# Patient Record
Sex: FEMALE | Race: WHITE | NOT HISPANIC OR LATINO | Employment: FULL TIME | URBAN - NONMETROPOLITAN AREA
[De-identification: names, ages, dates, MRNs, and addresses within clinical notes are randomized per-mention and may not be internally consistent; named-entity substitution may affect disease eponyms.]

---

## 2018-08-17 ENCOUNTER — TELEPHONE (OUTPATIENT)
Dept: SURGERY | Facility: CLINIC | Age: 28
End: 2018-08-17

## 2018-08-17 NOTE — TELEPHONE ENCOUNTER
Called the patient to remind them of an upcoming appointment with general surgery. I was unable to reach to reach the patient.

## 2018-08-20 ENCOUNTER — TELEPHONE (OUTPATIENT)
Dept: SURGERY | Facility: CLINIC | Age: 28
End: 2018-08-20

## 2018-08-20 ENCOUNTER — OFFICE VISIT (OUTPATIENT)
Dept: SURGERY | Facility: CLINIC | Age: 28
End: 2018-08-20

## 2018-08-20 VITALS
WEIGHT: 178 LBS | BODY MASS INDEX: 30.39 KG/M2 | SYSTOLIC BLOOD PRESSURE: 102 MMHG | RESPIRATION RATE: 16 BRPM | TEMPERATURE: 97.8 F | OXYGEN SATURATION: 100 % | HEIGHT: 64 IN | DIASTOLIC BLOOD PRESSURE: 66 MMHG | HEART RATE: 70 BPM

## 2018-08-20 DIAGNOSIS — L72.3 SEBACEOUS CYST: Primary | ICD-10-CM

## 2018-08-20 PROCEDURE — 99202 OFFICE O/P NEW SF 15 MIN: CPT | Performed by: SURGERY

## 2018-08-20 NOTE — PROGRESS NOTES
Patient: Ruth Gary    YOB: 1990    Date: 08/20/2018    Primary Care Provider: Irena Padron APRN    Chief Complaint   Patient presents with   • Skin Lesion     right posterior thigh.       Subjective .     History of present illness:  Pt is here for evaluation of a skin lesion on her posterior right thigh which has been present for many years.  Pt stated that the skin lesion has gotten bigger in size recently.  Pt denies redness and she denies warmth at the site.  No drainage from lesion.  Increased discomfort and pain.  Especially with sitting.    The following portions of the patient's history were reviewed and updated as appropriate: allergies, current medications, past family history, past medical history, past social history, past surgical history and problem list.      Review of Systems   Constitutional: Negative for chills, fever and unexpected weight change.   HENT: Negative for hearing loss, trouble swallowing and voice change.    Eyes: Negative for visual disturbance.   Respiratory: Negative for apnea, cough, chest tightness, shortness of breath and wheezing.    Cardiovascular: Negative for chest pain, palpitations and leg swelling.   Gastrointestinal: Negative for abdominal distention, abdominal pain, anal bleeding, blood in stool, constipation, diarrhea, nausea, rectal pain and vomiting.   Endocrine: Negative for cold intolerance and heat intolerance.   Genitourinary: Negative for difficulty urinating, dysuria and flank pain.   Musculoskeletal: Negative for back pain and gait problem.   Skin: Negative for color change, rash and wound.   Neurological: Negative for dizziness, syncope, speech difficulty, weakness, light-headedness, numbness and headaches.   Hematological: Negative for adenopathy. Does not bruise/bleed easily.   Psychiatric/Behavioral: Negative for confusion. The patient is not nervous/anxious.        Allergies:  No Known Allergies    Medications:  No current  "outpatient prescriptions on file.    History\"  No past medical history on file.    Past Surgical History:   Procedure Laterality Date   •  SECTION     • WISDOM TOOTH EXTRACTION         Family History   Problem Relation Age of Onset   • Hypertension Mother    • Hypertension Father    • Diabetes Father        Social History   Substance Use Topics   • Smoking status: Current Some Day Smoker   • Smokeless tobacco: Never Used   • Alcohol use Yes        Objective     Vital Signs:   Vitals:    18 1407   BP: 102/66   Pulse: 70   Resp: 16   Temp: 97.8 °F (36.6 °C)   TempSrc: Temporal Artery    SpO2: 100%   Weight: 80.7 kg (178 lb)   Height: 162.6 cm (64\")       Physical Exam:   General Appearance:    Alert, cooperative, in no acute distress   Head:    Normocephalic, without obvious abnormality, atraumatic   Eyes:            Lids and lashes normal, conjunctivae and sclerae normal, no   icterus, no pallor, corneas clear, PERRLA   Ears:    Ears appear intact with no abnormalities noted   Throat:   No oral lesions, no thrush, oral mucosa moist   Neck:   No adenopathy, supple, trachea midline, no thyromegaly, no   carotid bruit, no JVD   Lungs:     Clear to auscultation,respirations regular, even and                  unlabored    Heart:    Regular rhythm and normal rate, normal S1 and S2, no            murmur, no gallop, no rub, no click   Chest Wall:    No abnormalities observed   Abdomen:     Normal bowel sounds, no masses, no organomegaly, soft        non-tender, non-distended, no guarding, no rebound                tenderness   Extremities:   Moves all extremities well, no edema, no cyanosis, no             redness   Pulses:   Pulses palpable and equal bilaterally   Skin:   No bleeding, bruising or rash.  2.5-3 cm cyst right medial thigh.  Tender and fluctuant    Lymph nodes:   No palpable adenopathy   Neurologic:   Cranial nerves 2 - 12 grossly intact, sensation intact, DTR       present and equal bilaterally "     Results Review:   I reviewed the patient's new clinical results.    Assessment/Plan     1. Sebaceous cyst        Schedule excision of lesion in the office.  Risk of bleeding, infection and recurrence discussed and patient agreeable    I discussed the patients findings and my recommendations with patient    Review of Systems was reviewed and confirmed as accurate today.    Electronically signed by Rosemary Sam MD  08/20/18      .    Portions of this note have been scribed for Rosemary Sam MD by Alcira Nance. 8/20/2018  2:30 PM

## 2018-08-24 ENCOUNTER — TELEPHONE (OUTPATIENT)
Dept: SURGERY | Facility: CLINIC | Age: 28
End: 2018-08-24

## 2018-08-27 ENCOUNTER — TELEPHONE (OUTPATIENT)
Dept: SURGERY | Facility: CLINIC | Age: 28
End: 2018-08-27

## 2018-08-27 ENCOUNTER — PROCEDURE VISIT (OUTPATIENT)
Dept: SURGERY | Facility: CLINIC | Age: 28
End: 2018-08-27

## 2018-08-27 VITALS
HEIGHT: 64 IN | WEIGHT: 178 LBS | BODY MASS INDEX: 30.39 KG/M2 | SYSTOLIC BLOOD PRESSURE: 110 MMHG | OXYGEN SATURATION: 99 % | HEART RATE: 78 BPM | TEMPERATURE: 98.2 F | DIASTOLIC BLOOD PRESSURE: 60 MMHG

## 2018-08-27 DIAGNOSIS — L72.3 SEBACEOUS CYST: Primary | ICD-10-CM

## 2018-08-27 PROCEDURE — 12032 INTMD RPR S/A/T/EXT 2.6-7.5: CPT | Performed by: SURGERY

## 2018-08-27 PROCEDURE — 11403 EXC TR-EXT B9+MARG 2.1-3CM: CPT | Performed by: SURGERY

## 2018-08-27 NOTE — PROGRESS NOTES
Location:posterior right thigh    Procedure: Excision 2.8 cm cyst right buttock with layered closure      I recommend excision. Procedure and the risks and benefits were explained including bleeding and infection. The patient understands these and wishes to proceed.     The patient was brought to the procedure room. Consent and time out were performed. The area was prepped and draped in the usual fashion. 1% lidocaine with epinephrine was infused locally. An ellyptical incision was made around the lesion. Full thickness excision was performed. The lesion size was 2.8 cm. The wound was closed in layers with interrupted simple vicryl and Nylon for the skin. Wound closure size was 3.5 cm. There were no complications and the patient tolerated the procedure well. Hemostasis was well controlled with pressure and there was minimal blood loss. Wound instructions were given.     Portions of this note have been scribed for Rosemary Sam MD by Lakeisha Lancaster. 8/27/2018  2:58 PM

## 2018-09-04 ENCOUNTER — OFFICE VISIT (OUTPATIENT)
Dept: SURGERY | Facility: CLINIC | Age: 28
End: 2018-09-04

## 2018-09-04 DIAGNOSIS — Z98.890 H/O REMOVAL OF CYST: Primary | ICD-10-CM

## 2018-09-04 NOTE — PROGRESS NOTES
Patient comes in today to have suture removal from a recent excision of a cyst at right buttock. The sutures were removed and there was no redness or drainage from the sight. The patient has no complaints.         Portions of this note have been scribed for NURSE [facility] by Gabi Gray CMA 9/4/2018  11:35 AM

## 2024-02-22 ENCOUNTER — APPOINTMENT (OUTPATIENT)
Dept: GENERAL RADIOLOGY | Facility: HOSPITAL | Age: 34
End: 2024-02-22
Payer: COMMERCIAL

## 2024-02-22 ENCOUNTER — HOSPITAL ENCOUNTER (EMERGENCY)
Facility: HOSPITAL | Age: 34
Discharge: HOME | End: 2024-02-22
Attending: EMERGENCY MEDICINE
Payer: COMMERCIAL

## 2024-02-22 VITALS
RESPIRATION RATE: 16 BRPM | OXYGEN SATURATION: 97 % | BODY MASS INDEX: 30.73 KG/M2 | TEMPERATURE: 98.2 F | DIASTOLIC BLOOD PRESSURE: 77 MMHG | SYSTOLIC BLOOD PRESSURE: 118 MMHG | HEART RATE: 75 BPM | HEIGHT: 64 IN | WEIGHT: 180 LBS

## 2024-02-22 DIAGNOSIS — R07.9 CHEST PAIN, UNSPECIFIED TYPE: Primary | ICD-10-CM

## 2024-02-22 LAB
ALBUMIN SERPL-MCNC: 4.9 G/DL (ref 3.5–5.2)
ALBUMIN/GLOB SERPL: 1.8 G/DL
ALP SERPL-CCNC: 69 U/L (ref 39–117)
ALT SERPL W P-5'-P-CCNC: 13 U/L (ref 1–33)
ANION GAP SERPL CALCULATED.3IONS-SCNC: 15.5 MMOL/L (ref 5–15)
AST SERPL-CCNC: 17 U/L (ref 1–32)
B-HCG UR QL: NEGATIVE
BASOPHILS # BLD AUTO: 0.02 10*3/MM3 (ref 0–0.2)
BASOPHILS NFR BLD AUTO: 0.3 % (ref 0–1.5)
BILIRUB SERPL-MCNC: 0.4 MG/DL (ref 0–1.2)
BILIRUB UR QL STRIP: NEGATIVE
BUN SERPL-MCNC: 12 MG/DL (ref 6–20)
BUN/CREAT SERPL: 14.8 (ref 7–25)
CALCIUM SPEC-SCNC: 9.1 MG/DL (ref 8.6–10.5)
CHLORIDE SERPL-SCNC: 102 MMOL/L (ref 98–107)
CLARITY UR: CLEAR
CO2 SERPL-SCNC: 21.5 MMOL/L (ref 22–29)
COLOR UR: YELLOW
CREAT SERPL-MCNC: 0.81 MG/DL (ref 0.57–1)
DEPRECATED RDW RBC AUTO: 42.3 FL (ref 37–54)
EGFRCR SERPLBLD CKD-EPI 2021: 96.6 ML/MIN/1.73
EOSINOPHIL # BLD AUTO: 0.01 10*3/MM3 (ref 0–0.4)
EOSINOPHIL NFR BLD AUTO: 0.1 % (ref 0.3–6.2)
ERYTHROCYTE [DISTWIDTH] IN BLOOD BY AUTOMATED COUNT: 15.4 % (ref 12.3–15.4)
FLUAV RNA RESP QL NAA+PROBE: NOT DETECTED
FLUBV RNA RESP QL NAA+PROBE: NOT DETECTED
GLOBULIN UR ELPH-MCNC: 2.8 GM/DL
GLUCOSE SERPL-MCNC: 108 MG/DL (ref 65–99)
GLUCOSE UR STRIP-MCNC: NEGATIVE MG/DL
HCT VFR BLD AUTO: 38.9 % (ref 34–46.6)
HGB BLD-MCNC: 12.7 G/DL (ref 12–15.9)
HGB UR QL STRIP.AUTO: NEGATIVE
HOLD SPECIMEN: NORMAL
HOLD SPECIMEN: NORMAL
IMM GRANULOCYTES # BLD AUTO: 0.02 10*3/MM3 (ref 0–0.05)
IMM GRANULOCYTES NFR BLD AUTO: 0.3 % (ref 0–0.5)
KETONES UR QL STRIP: NEGATIVE
LEUKOCYTE ESTERASE UR QL STRIP.AUTO: NEGATIVE
LYMPHOCYTES # BLD AUTO: 1.37 10*3/MM3 (ref 0.7–3.1)
LYMPHOCYTES NFR BLD AUTO: 18.3 % (ref 19.6–45.3)
MCH RBC QN AUTO: 24.7 PG (ref 26.6–33)
MCHC RBC AUTO-ENTMCNC: 32.6 G/DL (ref 31.5–35.7)
MCV RBC AUTO: 75.7 FL (ref 79–97)
MONOCYTES # BLD AUTO: 0.46 10*3/MM3 (ref 0.1–0.9)
MONOCYTES NFR BLD AUTO: 6.1 % (ref 5–12)
NEUTROPHILS NFR BLD AUTO: 5.62 10*3/MM3 (ref 1.7–7)
NEUTROPHILS NFR BLD AUTO: 74.9 % (ref 42.7–76)
NITRITE UR QL STRIP: NEGATIVE
NRBC BLD AUTO-RTO: 0 /100 WBC (ref 0–0.2)
PH UR STRIP.AUTO: 6.5 [PH] (ref 5–8)
PLATELET # BLD AUTO: 273 10*3/MM3 (ref 140–450)
PMV BLD AUTO: 10.6 FL (ref 6–12)
POTASSIUM SERPL-SCNC: 3.9 MMOL/L (ref 3.5–5.2)
PROT SERPL-MCNC: 7.7 G/DL (ref 6–8.5)
PROT UR QL STRIP: NEGATIVE
RBC # BLD AUTO: 5.14 10*6/MM3 (ref 3.77–5.28)
SARS-COV-2 RNA RESP QL NAA+PROBE: NOT DETECTED
SODIUM SERPL-SCNC: 139 MMOL/L (ref 136–145)
SP GR UR STRIP: <=1.005 (ref 1–1.03)
TROPONIN T SERPL HS-MCNC: <6 NG/L
UROBILINOGEN UR QL STRIP: NORMAL
WBC NRBC COR # BLD AUTO: 7.5 10*3/MM3 (ref 3.4–10.8)
WHOLE BLOOD HOLD COAG: NORMAL
WHOLE BLOOD HOLD SPECIMEN: NORMAL

## 2024-02-22 PROCEDURE — 85025 COMPLETE CBC W/AUTO DIFF WBC: CPT

## 2024-02-22 PROCEDURE — 80053 COMPREHEN METABOLIC PANEL: CPT

## 2024-02-22 PROCEDURE — 93005 ELECTROCARDIOGRAM TRACING: CPT

## 2024-02-22 PROCEDURE — 71045 X-RAY EXAM CHEST 1 VIEW: CPT

## 2024-02-22 PROCEDURE — 99284 EMERGENCY DEPT VISIT MOD MDM: CPT

## 2024-02-22 PROCEDURE — 87636 SARSCOV2 & INF A&B AMP PRB: CPT | Performed by: NURSE PRACTITIONER

## 2024-02-22 PROCEDURE — 81003 URINALYSIS AUTO W/O SCOPE: CPT | Performed by: NURSE PRACTITIONER

## 2024-02-22 PROCEDURE — 84484 ASSAY OF TROPONIN QUANT: CPT

## 2024-02-22 PROCEDURE — 81025 URINE PREGNANCY TEST: CPT | Performed by: NURSE PRACTITIONER

## 2024-02-22 RX ORDER — SODIUM CHLORIDE 0.9 % (FLUSH) 0.9 %
10 SYRINGE (ML) INJECTION AS NEEDED
Status: DISCONTINUED | OUTPATIENT
Start: 2024-02-22 | End: 2024-02-22 | Stop reason: HOSPADM

## 2024-02-22 RX ORDER — ASPIRIN 325 MG
325 TABLET ORAL ONCE
Status: DISCONTINUED | OUTPATIENT
Start: 2024-02-22 | End: 2024-02-22

## 2024-02-22 NOTE — DISCHARGE INSTRUCTIONS
Patient requested to follow-up with primary care provider and her cardiologist within the next 7 to 10 days for reevaluation.  Strict return precautions have been given and she verbalized understanding

## 2024-02-22 NOTE — ED PROVIDER NOTES
Pt Name: Ruth Gary  MRN: [id number]  : 1990  Date of Encounter: 2024    PCP: Irena Padron APRN      Subjective    History of Present Illness:    Chief Complaint: Chest pain, tachycardia    History of Present Illness: Ruth Gary is a 34 y.o. female who presents to the ER complaining of  chest pain, tachycardia.  Patient states her chest pain and tachycardia started yesterday has been stable throughout the night has not worsened.  Upon arrival to the emergency room patient's heart rate was 88 chest pain was negative.  Patient denies any shortness of breath cough congestion.  Patient states she has had history of tachycardia and is previously worn a Holter monitor for evaluation.  Patient denies any family history of cardiac disease.        Nurses Notes reviewed and agree, including vitals, allergies, social history and prior medical history.       Allergies:    Patient has no known allergies.    History reviewed. No pertinent past medical history.    Past Surgical History:   Procedure Laterality Date     SECTION      WISDOM TOOTH EXTRACTION         Social History     Socioeconomic History    Marital status: Legally    Tobacco Use    Smoking status: Some Days    Smokeless tobacco: Never   Substance and Sexual Activity    Alcohol use: Yes    Drug use: No    Sexual activity: Defer       Family History   Problem Relation Age of Onset    Hypertension Mother     Hypertension Father     Diabetes Father        REVIEW OF SYSTEMS:     All systems reviewed and not pertinent unless noted.    Review of Systems   Cardiovascular:  Positive for chest pain and palpitations.   All other systems reviewed and are negative.      Objective    Physical Exam  Vitals and nursing note reviewed.   Constitutional:       Appearance: Normal appearance.   HENT:      Head: Normocephalic and atraumatic.   Eyes:      Extraocular Movements: Extraocular movements intact.      Pupils: Pupils are equal,  round, and reactive to light.   Cardiovascular:      Rate and Rhythm: Normal rate and regular rhythm.      Pulses: Normal pulses.           Radial pulses are 1+ on the right side and 1+ on the left side.        Posterior tibial pulses are 1+ on the right side and 1+ on the left side.      Heart sounds: Normal heart sounds.   Pulmonary:      Effort: Pulmonary effort is normal.      Breath sounds: Normal breath sounds.   Abdominal:      General: Abdomen is flat. Bowel sounds are normal.      Palpations: Abdomen is soft.   Musculoskeletal:      Cervical back: Normal range of motion and neck supple.   Skin:     Capillary Refill: Capillary refill takes less than 2 seconds.   Neurological:      General: No focal deficit present.      Mental Status: She is alert and oriented to person, place, and time. Mental status is at baseline.      GCS: GCS eye subscore is 4. GCS verbal subscore is 5. GCS motor subscore is 6.      Sensory: Sensation is intact.      Motor: Motor function is intact.      Gait: Gait is intact.   Psychiatric:         Attention and Perception: Attention and perception normal.         Mood and Affect: Mood and affect normal.         Speech: Speech normal.         Behavior: Behavior normal. Behavior is cooperative.             HEART Score: 0            PERC Rule for Pulmonary Embolism - MDCalc  Calculated on Feb 22 2024 11:40 AM  0 criteria -> No need for further workup, as <2% chance of PE. If no criteria are positive and clinician’s pre-test probability is <15%, PERC Rule criteria are satisfied.    HEART Score for Major Cardiac Events - MDCalc  Calculated on Feb 22 2024 11:42 AM  0 points -> Low Score (0-3 points) Risk of MACE of 0.9-1.7%.    Procedures    ED Course:    ED Course as of 02/22/24 1146   Thu Feb 22, 2024   1047 EKG interpreted by me as normal sinus rhythm, no acute ST elevation, normal axis, intervals within normal limits.  No old EKG for comparison at this time. [AP]      ED Course User  Index  [AP] Abhi Banks,        LAB Results:    Lab Results (last 24 hours)       Procedure Component Value Units Date/Time    CBC & Differential [[id number]]  (Abnormal) Collected: 02/22/24 1035    Specimen: Blood Updated: 02/22/24 1049    Narrative:      The following orders were created for panel order CBC & Differential.  Procedure                               Abnormality         Status                     ---------                               -----------         ------                     CBC Auto Differential[[id number]]        Abnormal            Final result                 Please view results for these tests on the individual orders.    Comprehensive Metabolic Panel [[id number]]  (Abnormal) Collected: 02/22/24 1035    Specimen: Blood Updated: 02/22/24 1116     Glucose 108 mg/dL      BUN 12 mg/dL      Creatinine 0.81 mg/dL      Sodium 139 mmol/L      Potassium 3.9 mmol/L      Chloride 102 mmol/L      CO2 21.5 mmol/L      Calcium 9.1 mg/dL      Total Protein 7.7 g/dL      Albumin 4.9 g/dL      ALT (SGPT) 13 U/L      AST (SGOT) 17 U/L      Alkaline Phosphatase 69 U/L      Total Bilirubin 0.4 mg/dL      Globulin 2.8 gm/dL      A/G Ratio 1.8 g/dL      BUN/Creatinine Ratio 14.8     Anion Gap 15.5 mmol/L      eGFR 96.6 mL/min/1.73     Narrative:      GFR Normal >60  Chronic Kidney Disease <60  Kidney Failure <15      High Sensitivity Troponin T [[id number]]  (Normal) Collected: 02/22/24 1035    Specimen: Blood Updated: 02/22/24 1119     HS Troponin T <6 ng/L     Narrative:      High Sensitive Troponin T Reference Range:  <14.0 ng/L- Negative Female for AMI  <22.0 ng/L- Negative Male for AMI  >=14 - Abnormal Female indicating possible myocardial injury.  >=22 - Abnormal Male indicating possible myocardial injury.   Clinicians would have to utilize clinical acumen, EKG, Troponin, and serial changes to determine if it is an Acute Myocardial Infarction or myocardial injury due to an underlying chronic  condition.         CBC Auto Differential [[id number]]  (Abnormal) Collected: 02/22/24 1035    Specimen: Blood Updated: 02/22/24 1049     WBC 7.50 10*3/mm3      RBC 5.14 10*6/mm3      Hemoglobin 12.7 g/dL      Hematocrit 38.9 %      MCV 75.7 fL      MCH 24.7 pg      MCHC 32.6 g/dL      RDW 15.4 %      RDW-SD 42.3 fl      MPV 10.6 fL      Platelets 273 10*3/mm3      Neutrophil % 74.9 %      Lymphocyte % 18.3 %      Monocyte % 6.1 %      Eosinophil % 0.1 %      Basophil % 0.3 %      Immature Grans % 0.3 %      Neutrophils, Absolute 5.62 10*3/mm3      Lymphocytes, Absolute 1.37 10*3/mm3      Monocytes, Absolute 0.46 10*3/mm3      Eosinophils, Absolute 0.01 10*3/mm3      Basophils, Absolute 0.02 10*3/mm3      Immature Grans, Absolute 0.02 10*3/mm3      nRBC 0.0 /100 WBC     COVID PRE-OP / PRE-PROCEDURE SCREENING ORDER (NO ISOLATION) - Swab, Nasopharynx [[id number]]  (Normal) Collected: 02/22/24 1039    Specimen: Swab from Nasopharynx Updated: 02/22/24 1107    Narrative:      The following orders were created for panel order COVID PRE-OP / PRE-PROCEDURE SCREENING ORDER (NO ISOLATION) - Swab, Nasopharynx.  Procedure                               Abnormality         Status                     ---------                               -----------         ------                     COVID-19 and FLU A/B PCR...[[id number]]  Normal              Final result                 Please view results for these tests on the individual orders.    COVID-19 and FLU A/B PCR, 1 HR TAT - Swab, Nasopharynx [[id number]]  (Normal) Collected: 02/22/24 1039    Specimen: Swab from Nasopharynx Updated: 02/22/24 1107     COVID19 Not Detected     Influenza A PCR Not Detected     Influenza B PCR Not Detected    Narrative:      Fact sheet for providers: https://www.fda.gov/media/909974/download    Fact sheet for patients: https://www.fda.gov/media/834938/download    Test performed by PCR.    Pregnancy, Urine - Urine, Clean Catch [[id number]]  (Normal)  Collected: 02/22/24 1049    Specimen: Urine, Clean Catch Updated: 02/22/24 1058     HCG, Urine QL Negative    Urinalysis With Culture If Indicated - Urine, Clean Catch [[id number]]  (Normal) Collected: 02/22/24 1049    Specimen: Urine, Clean Catch Updated: 02/22/24 1056     Color, UA Yellow     Appearance, UA Clear     pH, UA 6.5     Specific Gravity, UA <=1.005     Glucose, UA Negative     Ketones, UA Negative     Bilirubin, UA Negative     Blood, UA Negative     Protein, UA Negative     Leuk Esterase, UA Negative     Nitrite, UA Negative     Urobilinogen, UA 0.2 E.U./dL    Narrative:      In absence of clinical symptoms, the presence of pyuria, bacteria, and/or nitrites on the urinalysis result does not correlate with infection.  Urine microscopic not indicated.             If labs were ordered, I have independently reviewed the results and considered them in the diagnosis and treatment plan for the patient    RADIOLOGY    XR Chest 1 View    Result Date: 2/22/2024  PORTABLE CHEST  HISTORY: Chest pain.  COMPARISON: None.  FINDINGS: The cardiac silhouette is normal in size. The mediastinum is unremarkable. The lungs are clear. There is no pneumothorax. The osseous structures are unremarkable.      Impression: No acute cardiopulmonary process.     This report was signed and finalized on 2/22/2024 10:58 AM by Zoltan Vargas MD.        If I have ordered, I have independently reviewed the above noted radiographic studies.  Please see the radiologist dictation for the official interpretation    Medications given to patient in the ER    Medications   sodium chloride 0.9 % flush 10 mL (has no administration in time range)           I have discussed all the test results with patient and available family and the plan for disposition is discharge home and request patient follow-up with primary care provider and cardiology within the next 7 to 10 days for reevaluation of her symptoms..      Medical Decision Making  Ruth Sanders  Abdirahman is a 34 y.o. female who presents to the ER complaining of  chest pain, tachycardia.  Patient states her chest pain and tachycardia started yesterday has been stable throughout the night has not worsened.  Upon arrival to the emergency room patient's heart rate was 88 chest pain was negative.  Patient denies any shortness of breath cough congestion.  Patient states she has had history of tachycardia and is previously worn a Holter monitor for evaluation.  Patient denies any family history of cardiac disease.    DDX: includes but is not limited to: COVID-19, influenza, viral respiratory illness, NSTEMI, STEMI, chest pain unspecified, palpitations other,     Problems Addressed:  Chest pain, unspecified type: acute illness or injury    Amount and/or Complexity of Data Reviewed  External Data Reviewed:      Details: I have personally reviewed labs, radiology EKG and notes from patient's chart  Labs: ordered. Decision-making details documented in ED Course.     Details: I have personally reviewed and documented all results  Radiology: ordered. Decision-making details documented in ED Course.     Details: I have personally reviewed and documented all results  ECG/medicine tests: ordered. Decision-making details documented in ED Course.     Details: I have personally reviewed and documented all results  Discussion of management or test interpretation with external provider(s): Discussed assessment, treatment and plan with ER attending    Risk  Risk Details: I have discussed with patient the finding of the test preformed today. Patient has been diagnosed with chest pain unspecified and will be discharged home.  Patient requested to follow-up with primary care provider, cardiology within the next 7 days for reevaluation. Strict return precautions have been given and patient verbalizes understanding          Final diagnoses:   Chest pain, unspecified type         Please note that portions of this document were completed  using voice recognition dictation software.       Jerry Heart, ANALY  02/22/24 1145       Jerry Heart, ANALY  02/22/24 1146

## 2024-09-04 ENCOUNTER — OFFICE VISIT (OUTPATIENT)
Dept: OBSTETRICS AND GYNECOLOGY | Facility: CLINIC | Age: 34
End: 2024-09-04
Payer: COMMERCIAL

## 2024-09-04 VITALS
HEIGHT: 64 IN | SYSTOLIC BLOOD PRESSURE: 114 MMHG | WEIGHT: 146.2 LBS | BODY MASS INDEX: 24.96 KG/M2 | DIASTOLIC BLOOD PRESSURE: 72 MMHG

## 2024-09-04 DIAGNOSIS — N92.6 IRREGULAR MENSES: ICD-10-CM

## 2024-09-04 DIAGNOSIS — N94.10 FEMALE DYSPAREUNIA: Primary | ICD-10-CM

## 2024-09-04 DIAGNOSIS — N94.9 UTERINE TENDERNESS: ICD-10-CM

## 2024-09-04 DIAGNOSIS — D25.9 UTERINE LEIOMYOMA, UNSPECIFIED LOCATION: ICD-10-CM

## 2024-09-04 DIAGNOSIS — N85.4 RETROVERTED UTERUS: ICD-10-CM

## 2024-09-04 PROCEDURE — 99204 OFFICE O/P NEW MOD 45 MIN: CPT | Performed by: OBSTETRICS & GYNECOLOGY

## 2024-09-04 RX ORDER — FERROUS SULFATE 325(65) MG
325 TABLET ORAL DAILY
COMMUNITY
Start: 2024-04-02 | End: 2024-09-04

## 2024-09-04 RX ORDER — HYDROXYZINE HYDROCHLORIDE 10 MG/1
10 TABLET, FILM COATED ORAL EVERY 6 HOURS PRN
COMMUNITY
Start: 2024-07-23

## 2024-09-06 LAB
A VAGINAE DNA VAG QL NAA+PROBE: NORMAL SCORE
BVAB2 DNA VAG QL NAA+PROBE: NORMAL SCORE
C ALBICANS DNA VAG QL NAA+PROBE: NEGATIVE
C GLABRATA DNA VAG QL NAA+PROBE: NEGATIVE
C TRACH DNA SPEC QL NAA+PROBE: NEGATIVE
MEGA1 DNA VAG QL NAA+PROBE: NORMAL SCORE
N GONORRHOEA DNA VAG QL NAA+PROBE: NEGATIVE
T VAGINALIS DNA VAG QL NAA+PROBE: NEGATIVE

## 2024-09-07 LAB
17OHP SERPL-MCNC: 143 NG/DL
DHEA-S SERPL-MCNC: 219 UG/DL (ref 57.3–279.2)
ESTRADIOL SERPL-MCNC: 127 PG/ML
FSH SERPL-ACNC: 3.2 MIU/ML
LH SERPL-ACNC: 2.5 MIU/ML
PROGEST SERPL-MCNC: 13 NG/ML
PROLACTIN SERPL-MCNC: 7.7 NG/ML (ref 4.8–33.4)
TESTOST SERPL-MCNC: 3 NG/DL (ref 8–60)

## 2024-09-16 PROBLEM — N92.6 IRREGULAR MENSES: Status: ACTIVE | Noted: 2024-09-16

## 2024-09-16 PROBLEM — N85.4 RETROVERTED UTERUS: Status: ACTIVE | Noted: 2024-09-16

## 2024-09-16 PROBLEM — D25.9 UTERINE LEIOMYOMA: Status: ACTIVE | Noted: 2024-09-16

## 2024-09-16 PROBLEM — N94.10 FEMALE DYSPAREUNIA: Status: ACTIVE | Noted: 2024-09-16

## 2024-09-16 PROBLEM — N94.9 UTERINE TENDERNESS: Status: ACTIVE | Noted: 2024-09-16

## 2025-01-28 ENCOUNTER — TELEPHONE (OUTPATIENT)
Dept: SURGERY | Facility: CLINIC | Age: 35
End: 2025-01-28
Payer: COMMERCIAL

## 2025-01-28 NOTE — TELEPHONE ENCOUNTER
SPOKE WITH BROOKE AT REFERRING PCP OFFICE AND LET THEM KNOW PT CANCELLED AND UNABLE TO REACH TO R/S.